# Patient Record
Sex: FEMALE | Race: WHITE | NOT HISPANIC OR LATINO | Employment: OTHER | URBAN - METROPOLITAN AREA
[De-identification: names, ages, dates, MRNs, and addresses within clinical notes are randomized per-mention and may not be internally consistent; named-entity substitution may affect disease eponyms.]

---

## 2017-01-31 ENCOUNTER — OFFICE VISIT (OUTPATIENT)
Dept: PRIMARY CARE CLINIC | Facility: CLINIC | Age: 82
End: 2017-01-31
Payer: MEDICARE

## 2017-01-31 ENCOUNTER — CLINICAL SUPPORT (OUTPATIENT)
Dept: AUDIOLOGY | Facility: CLINIC | Age: 82
End: 2017-01-31

## 2017-01-31 ENCOUNTER — HOSPITAL ENCOUNTER (OUTPATIENT)
Dept: RADIOLOGY | Facility: HOSPITAL | Age: 82
Discharge: HOME | End: 2017-01-31
Attending: NURSE PRACTITIONER
Payer: MEDICARE

## 2017-01-31 VITALS
HEIGHT: 61 IN | WEIGHT: 149.94 LBS | HEART RATE: 74 BPM | OXYGEN SATURATION: 98 % | SYSTOLIC BLOOD PRESSURE: 160 MMHG | DIASTOLIC BLOOD PRESSURE: 82 MMHG | TEMPERATURE: 98 F | BODY MASS INDEX: 28.31 KG/M2

## 2017-01-31 DIAGNOSIS — S22.32XA CLOSED FRACTURE OF RIB OF LEFT SIDE, INITIAL ENCOUNTER: Primary | ICD-10-CM

## 2017-01-31 DIAGNOSIS — R07.81 RIB PAIN ON LEFT SIDE: ICD-10-CM

## 2017-01-31 DIAGNOSIS — R60.0 PEDAL EDEMA: ICD-10-CM

## 2017-01-31 DIAGNOSIS — Z46.1 ENCOUNTER FOR HEARING AID FITTING OF BOTH EARS: Primary | ICD-10-CM

## 2017-01-31 PROCEDURE — 99999 PR PBB SHADOW E&M-EST. PATIENT-LVL IV: CPT | Mod: PBBFAC,,, | Performed by: NURSE PRACTITIONER

## 2017-01-31 PROCEDURE — 99214 OFFICE O/P EST MOD 30 MIN: CPT | Mod: S$PBB,,, | Performed by: NURSE PRACTITIONER

## 2017-01-31 PROCEDURE — 71100 X-RAY EXAM RIBS UNI 2 VIEWS: CPT | Mod: TC,PO

## 2017-01-31 PROCEDURE — STAMTAX SALES TAX 9.75%: Mod: CSM,,, | Performed by: AUDIOLOGIST

## 2017-01-31 PROCEDURE — 71100 X-RAY EXAM RIBS UNI 2 VIEWS: CPT | Mod: 26,,, | Performed by: RADIOLOGY

## 2017-01-31 PROCEDURE — V5140 BEHIND EAR BINAUR HEARING AI: HCPCS | Mod: CSM,,, | Performed by: AUDIOLOGIST

## 2017-01-31 RX ORDER — NIFEDIPINE 30 MG/1
30 TABLET, EXTENDED RELEASE ORAL DAILY
COMMUNITY
End: 2017-03-20 | Stop reason: SDUPTHER

## 2017-01-31 RX ORDER — FUROSEMIDE 20 MG/1
TABLET ORAL
Qty: 1 TABLET | Refills: 0 | Status: SHIPPED | OUTPATIENT
Start: 2017-01-31 | End: 2017-02-16

## 2017-01-31 NOTE — PROGRESS NOTES
Cari Simental is a 89 y.o. female patient of Dr. Cristi King MD who presents to the clinic today for   Chief Complaint   Patient presents with    Fall     last week     Hip Pain     left hip had a fall last week     Foot Swelling   .    HPI      Pt, who is not known to me, reports a new problem to me, as follows:  While she was going to lunch, she tripped and fell onto her face/nose and under the left rib.  Hurting her to breathe.  Pain has improved some.  No trouble breathing, coughing, feeling like she's sick.    She's also got a lot of swelling in her feet.  She has been sitting more and hasn't been putting her feet up.  No change in the diet.    These symptoms began 1 week ago and status is improved.     Pt denies the following symptoms:  Fever, coughing, feeling ill    Aggravating factors include certain movements hurt the area.      Relieving factors include ice, tylenol .    OTC Medications tried are tylenol--helps some.    Prescription medications taken for symptoms are none.    Pertinent medical history:  Fell last week and continues to have pain.    ROS    Constitutional:  No fever, no change fatigue.    HEENT: Pain and swelling of the nose from the fall.    Heart/Lung:  Bilat pedal edema.  No cough or trouble breathing.    MS:  See Chief Complaint/HPI      ALLERGIES AND MEDICATIONS: updated and reviewed.  Review of patient's allergies indicates:  No Known Allergies  Current Outpatient Prescriptions   Medication Sig Dispense Refill    lisinopril (PRINIVIL,ZESTRIL) 20 MG tablet Take 20 mg by mouth once daily.      lisinopril-hydrochlorothiazide (PRINZIDE,ZESTORETIC) 20-25 mg Tab       mirtazapine (REMERON) 30 MG tablet Take one tablet PO nightly 90 tablet 3    nifedipine 30 MG ORAL TR24 (PROCARDIA-XL) 30 MG (OSM) 24 hr tablet Take 30 mg by mouth once daily.      sertraline (ZOLOFT) 100 MG tablet Take one tablet po twice daily 180 tablet 3    simvastatin (ZOCOR) 20 MG tablet Take 20 mg by  "mouth every evening.      vitamin D 1000 units Tab Take 185 mg by mouth 3 (three) times daily.      furosemide (LASIX) 20 MG tablet 1/2 pill daily in the morning for 2 days.  Please cut the pill in half for the pt, as she lives at assisted living. 1 tablet 0     No current facility-administered medications for this visit.          PHYSICAL EXAM    Alert, coop 89 y.o. female patient in mod distress, r/t pain.    Vitals:    01/31/17 1458   BP: (!) 160/82   BP Location: Right arm   Patient Position: Sitting   BP Method: Manual   Pulse: 74   Temp: 97.5 °F (36.4 °C)   TempSrc: Oral   SpO2: 98%   Weight: 68 kg (149 lb 14.6 oz)   Height: 5' 1" (1.549 m)     VS reviewed.  VS SBP mildly elevated.  CC, nursing note, medications, PMH, PSH and Soc hx all reviewed today.    Head:  Normocephalic, atraumatic.    EENT: Ext ears normal.  Bridge of nose with healing ecchymosis and edema,  to touch.  Edema causing some decrease air flow through the nares.  Eyes with normal lids, not injected.           Resp:  Respirations even, unlabored.              Lungs CTA bilat.    Heart:  RRR, no MRG  CV:  Cap RF brisk, post tib pulses 2+ bilat.          Bilat pedal edema up past the ankles, nonpitting    MS:  Left ant ribs under the lat inf breast is tender to palp.          No pain with palp of the ribs away from the area of pain.          No inc pain with AP or lat rib cage compression away from the area of pain.          Bridge of the nose edematous, ecchymotic from the fall.          When she moves (rises from chair, walks, gets into the WC) she makes small grunting noises but doesn't appear in resp distress.            NEURO:  Alert and oriented x 4.  Responds appropriately during interaction.    Skin:  Warm, dry, color good.    Psych:  Responds appropriately throughout the visit.               Relaxed.  Well-groomed.      Closed fracture of rib of left side, initial encounter  Comments:  ribs 3-6 on the left, mildly " displaced.    Rib pain on left side  -     X-Ray Ribs 2 View Left; Future; Expected date: 1/31/17    Pedal edema  -     furosemide (LASIX) 20 MG tablet; 1/2 pill daily in the morning for 2 days.  Please cut the pill in half for the pt, as she lives at assisted living.  Dispense: 1 tablet; Refill: 0      Pt today presents with continuing left ant rib pain and nose pain s/p fall 1 week ago.  She denies coughing, feeling ill..    This is a new problem to me and the following work up is planned.    Lab & Radiological Tests Ordered: Rib xray, left.  The results are ribs 3-6 fractured with mild displacement, ? Trace pleural fluid.  The xray was read by radiologist.    I have reviewed the following additional tests today: CMP for electrolytes, last done 2014, normal      Pt advised to perform comfort measures recommended on patient instruction sheet .    If worse fever, cough or feeling ill, the patient/daughter is advised to call us as this could mean she's getting pneumonia.  Explained exam findings, diagnosis and treatment plan to patient and her daughter, with her during the visit.  Questions answered and patient states understanding.

## 2017-01-31 NOTE — PATIENT INSTRUCTIONS
For the rib:  Xray--we'll call with the results (Linda Mobley ).    Cough and deep breathe many times a day.  Hold a pillow over the sore area.  Use analgesic patch from the store for pain.    For the ankles:  1/2 tablet of 20 mg lasix (furosemide) daily for 2 days (in the morning)  Elevate the feet.  Call the cardiologist, if any concerns about this.    If you have any questions, please call.  You can reach us at [phone] Monday through Friday (except holidays) 12 nooon to 6 p.m.    I will be in the office through 2/2/17.  I will return 2/13/17.  After that time, call your doctor's nurse, [facility] 24 hr nurse advice line or the on-call doctor for the day.    After hours and on weekends you can talk to a nurse 24 hrs a day/7 days a week.  Call [facility] at .    Dr. Guan and Dr. Avalos are accepting new patients.    Thank you for using the [facility]!    Анна Whiting, AMAN, CNP, FNP-BC  [facility]  [place]

## 2017-01-31 NOTE — Clinical Note
Cristi King MD,  I saw your patient today in the [facility].  If you have any questions, please do not hesitate to contact me.  Thank you!  RONY Ramírez

## 2017-01-31 NOTE — PROGRESS NOTES
Delivered a pair of Phonak Audeo B50-R RUDOLPH BTE (silver gray) hearing aids with size 2 power receivers, large power domes and retention wires for both ears. Set to 90% target gain in the right aid and 80% target gain in the left aid, no occlusion compensation in either aid, deactivated push button, and ran FB manager AU. Pt reported that these settings were comfortable and was happy with the perception of binaural hearing rather than monaural hearing.  Reviewed insertion/removal, daily care and maintenance, wax filter changing and battery charging procedure with patient and her niece (Linda). Pt's daughter will coordinate daily care and insertion/charging assistance with the staff at [facility] as needed. Scheduled pt to return in one week for a f/u appt to make adjustments to her settings as needed.     HA Model and Style: Phonak Audeo B5-R RUDOLPH BTEs (silver gray)  Right S/N: [id number]  Left S/N: [id number]   Size:   2 power right and left            Tips Used: large power domes    Repair Warranty and Loss/Damage Policy Expires:  4/4/2020

## 2017-02-16 ENCOUNTER — HOSPITAL ENCOUNTER (OUTPATIENT)
Dept: RADIOLOGY | Facility: HOSPITAL | Age: 82
Discharge: HOME | End: 2017-02-16
Attending: INTERNAL MEDICINE
Payer: MEDICARE

## 2017-02-16 ENCOUNTER — OFFICE VISIT (OUTPATIENT)
Dept: INTERNAL MEDICINE | Facility: CLINIC | Age: 82
End: 2017-02-16
Payer: MEDICARE

## 2017-02-16 VITALS
WEIGHT: 156.31 LBS | BODY MASS INDEX: 29.51 KG/M2 | HEART RATE: 72 BPM | HEIGHT: 61 IN | OXYGEN SATURATION: 95 % | RESPIRATION RATE: 18 BRPM

## 2017-02-16 DIAGNOSIS — L84 CALLUS OF FOOT: ICD-10-CM

## 2017-02-16 DIAGNOSIS — I10 ESSENTIAL HYPERTENSION: Primary | ICD-10-CM

## 2017-02-16 DIAGNOSIS — E78.5 HYPERLIPIDEMIA, UNSPECIFIED HYPERLIPIDEMIA TYPE: ICD-10-CM

## 2017-02-16 DIAGNOSIS — E05.90 HYPERTHYROIDISM: ICD-10-CM

## 2017-02-16 DIAGNOSIS — I51.89 DIASTOLIC DYSFUNCTION: ICD-10-CM

## 2017-02-16 DIAGNOSIS — R60.9 EDEMA, UNSPECIFIED TYPE: ICD-10-CM

## 2017-02-16 PROCEDURE — 93970 EXTREMITY STUDY: CPT | Mod: 26,,, | Performed by: RADIOLOGY

## 2017-02-16 PROCEDURE — 99999 PR PBB SHADOW E&M-EST. PATIENT-LVL III: CPT | Mod: PBBFAC,,, | Performed by: INTERNAL MEDICINE

## 2017-02-16 PROCEDURE — 99204 OFFICE O/P NEW MOD 45 MIN: CPT | Mod: S$PBB,,, | Performed by: INTERNAL MEDICINE

## 2017-02-16 PROCEDURE — 93970 EXTREMITY STUDY: CPT | Mod: TC,PO

## 2017-02-16 RX ORDER — MUPIROCIN 20 MG/G
OINTMENT TOPICAL 3 TIMES DAILY
Qty: 22 G | Refills: 0 | Status: SHIPPED | OUTPATIENT
Start: 2017-02-16 | End: 2017-02-26

## 2017-02-16 RX ORDER — LOSARTAN POTASSIUM AND HYDROCHLOROTHIAZIDE 12.5; 1 MG/1; MG/1
1 TABLET ORAL DAILY
Qty: 30 TABLET | Refills: 1 | Status: SHIPPED | OUTPATIENT
Start: 2017-02-16 | End: 2017-03-06

## 2017-02-16 RX ORDER — MUPIROCIN 20 MG/G
OINTMENT TOPICAL 3 TIMES DAILY
Qty: 22 G | Refills: 0 | Status: SHIPPED | OUTPATIENT
Start: 2017-02-16 | End: 2017-02-16 | Stop reason: SDUPTHER

## 2017-02-16 NOTE — MR AVS SNAPSHOT
[facility] Medicine  [address] [facility] [address] [place] [address]  Phone: [phone]  Fax: [phone]                  Cari Simental   2017 2:00 PM   Office Visit    Description:  Female : 1926   Provider:  Ros Herrera MD   Department:  [facility] Internal Medicine           Reason for Visit     Establish Care           Diagnoses this Visit        Comments    Essential hypertension    -  Primary     Hyperthyroidism         Hyperlipidemia, unspecified hyperlipidemia type         Callus of foot         Edema, unspecified type         Diastolic dysfunction                To Do List           Future Appointments        Provider Department Dept Phone    2017 3:15 PM LAB, [place] [facility] [phone]    2017 3:30 PM [facility] [phone]    2017 11:20 AM Dereck Crawford DPM [facility] Podiatry [phone]    3/6/2017 10:00 AM [facility], NURSE [facility] Family Medicine [phone]    4/3/2017 11:20 AM Gary Dee MD [facility] Cardiology [phone]      Goals (5 Years of Data)     None      Follow-Up and Disposition     Follow-up and Disposition History       These Medications        Disp Refills Start End    losartan-hydrochlorothiazide 100-12.5 mg (HYZAAR) 100-12.5 mg Tab 30 tablet 1 2017    Take 1 tablet by mouth once daily. - Oral    Pharmacy: [facility] [address] [place] [id number] [facility] Ph #: [phone]       mupirocin (BACTROBAN) 2 % ointment 22 g 0 2017    Apply topically 3 (three) times daily. - Topical (Top)    Pharmacy: [facility] [address] [facility] Ph #: [phone]         [facility]     [facility] Nurse Care Line -  Assistance  Registered nurses in the [facility] Center provide clinical advisement, health education, appointment booking, and other advisory services.  Call for this free  "service at [phone].             Medications           START taking these NEW medications        Refills    losartan-hydrochlorothiazide 100-12.5 mg (HYZAAR) 100-12.5 mg Tab 1    Sig: Take 1 tablet by mouth once daily.    Class: Normal    Route: Oral    mupirocin (BACTROBAN) 2 % ointment 0    Sig: Apply topically 3 (three) times daily.    Class: Normal    Route: Topical (Top)      STOP taking these medications     furosemide (LASIX) 20 MG tablet 1/2 pill daily in the morning for 2 days.  Please cut the pill in half for the pt, as she lives at assisted living.    lisinopril (PRINIVIL,ZESTRIL) 20 MG tablet Take 20 mg by mouth once daily.    lisinopril-hydrochlorothiazide (PRINZIDE,ZESTORETIC) 20-25 mg Tab            Verify that the below list of medications is an accurate representation of the medications you are currently taking.  If none reported, the list may be blank. If incorrect, please contact your healthcare provider. Carry this list with you in case of emergency.           Current Medications     mirtazapine (REMERON) 30 MG tablet Take one tablet PO nightly    nifedipine 30 MG ORAL TR24 (PROCARDIA-XL) 30 MG (OSM) 24 hr tablet Take 30 mg by mouth once daily.    sertraline (ZOLOFT) 100 MG tablet Take one tablet po twice daily    simvastatin (ZOCOR) 20 MG tablet Take 20 mg by mouth every evening.    vitamin D 1000 units Tab Take 185 mg by mouth 3 (three) times daily.    losartan-hydrochlorothiazide 100-12.5 mg (HYZAAR) 100-12.5 mg Tab Take 1 tablet by mouth once daily.    mupirocin (BACTROBAN) 2 % ointment Apply topically 3 (three) times daily.           Clinical Reference Information           Your Vitals Were     Pulse Resp Height Weight SpO2 BMI    72 18 5' 1" (1.549 m) 70.9 kg (156 lb 4.9 oz) 95% 29.53 kg/m2      Allergies as of 2/16/2017     No Known Allergies      Immunizations Administered on Date of Encounter - 2/16/2017     None      Orders Placed During Today's Visit      Normal Orders This Visit "    Ambulatory referral to Cardiology     Ambulatory referral to Podiatry     Future Labs/Procedures Expected by Expires    CBC auto differential  2/16/2017 5/17/2017    Comprehensive metabolic panel  2/16/2017 5/17/2017    Lipid panel  2/16/2017 2/17/2018    TSH  2/16/2017 2/17/2018    US Lower Extremity Veins Bilateral  2/16/2017 2/16/2018      Language Assistance Services     ATTENTION: Language assistance services are available, free of charge. Please call [phone].      ATENCIÓN: Si habla leablaise, tiene a hayes disposición servicios gratuitos de asistencia lingüística. Llame al [phone].     CHÚ Ý: N?u b?n nói Ti?ng Vi?t, có các d?ch v? h? tr? ngôn ng? mi?n phí dành cho b?n. G?i s? [phone].         [facility] complies with applicable Federal civil rights laws and does not discriminate on the basis of race, color, national origin, age, disability, or sex.

## 2017-02-16 NOTE — PROGRESS NOTES
"HISTORY OF PRESENT ILLNESS:  Pt. is a 89 y.o. female presents to establish care.  She really never had a primary care MD, Dr. King acted as that for her, but she was seen by Dr. Aguilar recently last month.  Though she is now in [place] and needs a local MD.  She has [facility] health through Dr. Aguilar.  It seems she sees cardiology Dr. Cristi King.  She has history of HTN, depression, dementia, hyperlipidemia, hyperthyroidism/Dr. Shell.  She states she last saw Dr. Shlel within the year, pt. States was stable, followup in one year.  She states she was diagnosed 8 years ago, used to take medication.  She states had ablation.  States did not have Graves Disease.  I have also reviewed Dr. Short's note, especially her instructions not to rely on benzodiazepines due to their high risk with this pt.  I agree with this approach.  She has had multiple elevated B/Ps, though family states systolics in the 140s.  2D echo 2014 showed:  "CONCLUSIONS     1 - Normal left ventricular systolic function (EF 60-65%).     2 - Left ventricular diastolic dysfunction.     3 - Concentric remodeling.     4 - Mild mitral regurgitation."    She states she had a "new" pneumonia shot at [facility].  She is here with her niece, who cares for her locally.      ROS:  GENERAL: No fever, chills, fatigability or weight loss.  SKIN: No rashes, itching or changes in color or texture of skin; large callus to left heel;  HEAD: No headaches or recent head trauma.  EARS: Denies ear pain, discharge or vertigo; wears hearing aids;  NOSE: No loss of smell, no epistaxis; occ postnasal drip.  MOUTH & THROAT: No hoarseness or change in voice. No excessive gum bleeding.  NODES: Denies swollen glands.  CHEST: Denies US, cyanosis, wheezing, cough and sputum production.  CARDIOVASCULAR: Denies chest pain, PND, orthopnea or reduced exercise tolerance.  ABDOMEN: Appetite fine. No weight loss. Denies constipation, diarrhea, abdominal pain, hematemesis " or blood in stool.  URINARY: No flank pain, dysuria or hematuria.  PERIPHERAL VASCULAR: No claudication or cyanosis. No edema.  MUSCULOSKELETAL: No joint stiffness or swelling. Denies back pain.  NEUROLOGIC: Denies numbness; weakness, ambulates with walker;    PE:   Vitals: 154/84    Vitals:    02/16/17 1358   Pulse: 72   Resp: 18       GENERAL: no acute distress, A&Ox3, comfortable.  Female with BMI of 28   HEENT: tympanic membranes clear, nasal mucosa pink, no pharyngeal erythema or exudate  NECK: supple, no cervical lymphadenopathy, no thyromegaly; no supraclavicular nodes;   CHEST:  clear to auscultation bilaterally, no crackles or wheeze; no increased work of breathing;  CARDIOVASCULAR: regular rate and rhythm, no rubs, murmurs or gallops.  ABDOMEN: normal bowel sounds, soft non-tender, non-distended; no palpable organomegaly;   EXT: no clubbing, cyanosis; positive edema to both lower extremities; positive callus/wound to heel on left;    ASSESSMENT/PLAN:    Extremely difficult for ptl to come in before breakfast, will go ahead and get lipid, as mostly interested in LDL;    Essential hypertension  -     losartan-hydrochlorothiazide 100-12.5 mg (HYZAAR) 100-12.5 mg Tab; Take 1 tablet by mouth once daily.  Dispense: 30 tablet; Refill: 1  -     CBC auto differential; Future; Expected date: 2/16/17    Hyperthyroidism  -     TSH; Future; Expected date: 2/16/17    Hyperlipidemia, unspecified hyperlipidemia type  -     Comprehensive metabolic panel; Future; Expected date: 2/16/17  -     Lipid panel; Future; Expected date: 2/16/17    Callus of foot  -     Ambulatory referral to Podiatry  -     Discontinue: mupirocin (BACTROBAN) 2 % ointment; Apply topically 3 (three) times daily.  Dispense: 22 g; Refill: 0  -     mupirocin (BACTROBAN) 2 % ointment; Apply topically 3 (three) times daily.  Dispense: 22 g; Refill: 0    Edema, unspecified type  -     US Lower Extremity Veins Bilateral; Future; Expected date:  2/16/17    Diastolic dysfunction  -     Ambulatory referral to Cardiology      Medication List with Changes/Refills   New Medications    LOSARTAN-HYDROCHLOROTHIAZIDE 100-12.5 MG (HYZAAR) 100-12.5 MG TAB    Take 1 tablet by mouth once daily.    MUPIROCIN (BACTROBAN) 2 % OINTMENT    Apply topically 3 (three) times daily.   Current Medications    MIRTAZAPINE (REMERON) 30 MG TABLET    Take one tablet PO nightly    NIFEDIPINE 30 MG ORAL TR24 (PROCARDIA-XL) 30 MG (OSM) 24 HR TABLET    Take 30 mg by mouth once daily.    SERTRALINE (ZOLOFT) 100 MG TABLET    Take one tablet po twice daily    SIMVASTATIN (ZOCOR) 20 MG TABLET    Take 20 mg by mouth every evening.    VITAMIN D 1000 UNITS TAB    Take 185 mg by mouth 3 (three) times daily.   Discontinued Medications    FUROSEMIDE (LASIX) 20 MG TABLET    1/2 pill daily in the morning for 2 days.  Please cut the pill in half for the pt, as she lives at assisted living.    LISINOPRIL (PRINIVIL,ZESTRIL) 20 MG TABLET    Take 20 mg by mouth once daily.    LISINOPRIL-HYDROCHLOROTHIAZIDE (PRINZIDE,ZESTORETIC) 20-25 MG TAB           Call if condition changes or worsens.

## 2017-02-20 ENCOUNTER — OFFICE VISIT (OUTPATIENT)
Dept: PODIATRY | Facility: CLINIC | Age: 82
End: 2017-02-20
Payer: MEDICARE

## 2017-02-20 VITALS — WEIGHT: 156.94 LBS | HEIGHT: 61 IN | BODY MASS INDEX: 29.63 KG/M2

## 2017-02-20 DIAGNOSIS — M79.672 FOOT PAIN, LEFT: ICD-10-CM

## 2017-02-20 DIAGNOSIS — L89.622 PRESSURE ULCER OF LEFT HEEL, STAGE 2: Primary | ICD-10-CM

## 2017-02-20 PROCEDURE — 99204 OFFICE O/P NEW MOD 45 MIN: CPT | Mod: S$PBB,25,, | Performed by: PODIATRIST

## 2017-02-20 PROCEDURE — 87070 CULTURE OTHR SPECIMN AEROBIC: CPT

## 2017-02-20 PROCEDURE — 97597 DBRDMT OPN WND 1ST 20 CM/<: CPT | Mod: PBBFAC,PO | Performed by: PODIATRIST

## 2017-02-20 PROCEDURE — 99213 OFFICE O/P EST LOW 20 MIN: CPT | Mod: PBBFAC,PO | Performed by: PODIATRIST

## 2017-02-20 PROCEDURE — 87075 CULTR BACTERIA EXCEPT BLOOD: CPT

## 2017-02-20 PROCEDURE — 99999 PR PBB SHADOW E&M-EST. PATIENT-LVL III: CPT | Mod: PBBFAC,,, | Performed by: PODIATRIST

## 2017-02-20 NOTE — MR AVS SNAPSHOT
"    [place] - [facility]  [address]  [place] [address]  Phone: [phone]                  Cari Simental   2017 11:20 AM   Office Visit    Description:  Female : 1926   Provider:  Dereck Crawford DPM   Department:  [facility] Podiatry           Reason for Visit     Wound Care                To Do List           Future Appointments        Provider Department Dept Phone    3/6/2017 9:20 AM Dereck Crawford DPM [facility] Podiatry [phone]    3/6/2017 10:00 AM [facility], NURSE [facility] Family Medicine [phone]    4/3/2017 11:20 AM Gary Dee MD [facility] Cardiology [phone]      Goals (5 Years of Data)     None      [facility]     [facility] Nurse Care Line -  Assistance  Registered nurses in the [facility] provide clinical advisement, health education, appointment booking, and other advisory services.  Call for this free service at [phone].             Medications                Verify that the below list of medications is an accurate representation of the medications you are currently taking.  If none reported, the list may be blank. If incorrect, please contact your healthcare provider. Carry this list with you in case of emergency.           Current Medications     losartan-hydrochlorothiazide 100-12.5 mg (HYZAAR) 100-12.5 mg Tab Take 1 tablet by mouth once daily.    mirtazapine (REMERON) 30 MG tablet Take one tablet PO nightly    mupirocin (BACTROBAN) 2 % ointment Apply topically 3 (three) times daily.    nifedipine 30 MG ORAL TR24 (PROCARDIA-XL) 30 MG (OSM) 24 hr tablet Take 30 mg by mouth once daily.    sertraline (ZOLOFT) 100 MG tablet Take one tablet po twice daily    simvastatin (ZOCOR) 20 MG tablet Take 20 mg by mouth every evening.    vitamin D 1000 units Tab Take 185 mg by mouth 3 (three) times daily.           Clinical Reference Information           Your Vitals Were     Height Weight BMI          5' 1" (1.549 m) 71.2 kg " (156 lb 15.5 oz) 29.66 kg/m2        Allergies as of 2/20/2017     No Known Allergies      Immunizations Administered on Date of Encounter - 2/20/2017     None      Language Assistance Services     ATTENTION: Language assistance services are available, free of charge. Please call [phone].      ATENCIÓN: Si habla rhoda, tiene a hayes disposición servicios gratuitos de asistencia lingüística. Llame al [phone].     CHÚ Ý: N?u b?n nói Ti?ng Vi?t, có các d?ch v? h? tr? ngôn ng? mi?n phí dành cho b?n. G?i s? [phone].         [facility] complies with applicable Federal civil rights laws and does not discriminate on the basis of race, color, national origin, age, disability, or sex.

## 2017-02-20 NOTE — PROGRESS NOTES
Subjective:      Patient ID: Cari Simental is a 89 y.o. female.    Chief Complaint: Wound Care (left heel)  Patient presents to clinic with the complaint of a Lt. Heel wound with an onset x 2-3 days.  States the heel wound emits sharp pain that she currently rates as a 4/10.  States symptoms are exacerbated with pressure to the heel and are alleviated with rest.  Was seen by her PCP and instructed to treat with a prescription of Bactroban which she has applied daily to the wound.  Unable to recall the inciting event leading to the ulceration.  Has not attempted to self treat.  Denies any additional pedal complaints.    Past Medical History   Diagnosis Date    Arthritis     Callus of foot 2/16/2017    Dizziness     H/O radioactive iodine thyroid ablation     Hearing loss     Hyperlipidemia     Hypertension     Hyperthyroidism     Mental disorder     Obesity     Sinusitis        Past Surgical History   Procedure Laterality Date    Adenoidectomy      Tonsillectomy      Thyroidectomy, partial         Family History   Problem Relation Age of Onset    COPD Mother      emphysema    Stroke Mother     Cancer Father      brain tumor;    Cancer Sister      breast cancer/thyroid cancer;    Allergic rhinitis Neg Hx     Allergies Neg Hx     Angioedema Neg Hx     Eczema Neg Hx     Immunodeficiency Neg Hx        Social History     Social History    Marital status:      Spouse name: N/A    Number of children: 0    Years of education: N/A     Occupational History    retired clerical      Social History Main Topics    Smoking status: Never Smoker    Smokeless tobacco: Never Used    Alcohol use No    Drug use: No    Sexual activity: No     Other Topics Concern    None     Social History Narrative       Current Outpatient Prescriptions   Medication Sig Dispense Refill    losartan-hydrochlorothiazide 100-12.5 mg (HYZAAR) 100-12.5 mg Tab Take 1 tablet by mouth once daily. 30 tablet 1     mirtazapine (REMERON) 30 MG tablet Take one tablet PO nightly 90 tablet 3    mupirocin (BACTROBAN) 2 % ointment Apply topically 3 (three) times daily. 22 g 0    nifedipine 30 MG ORAL TR24 (PROCARDIA-XL) 30 MG (OSM) 24 hr tablet Take 30 mg by mouth once daily.      sertraline (ZOLOFT) 100 MG tablet Take one tablet po twice daily 180 tablet 3    simvastatin (ZOCOR) 20 MG tablet Take 20 mg by mouth every evening.      vitamin D 1000 units Tab Take 185 mg by mouth 3 (three) times daily.       No current facility-administered medications for this visit.        Review of patient's allergies indicates:  No Known Allergies  Review of Systems   Constitution: Negative for chills and fever.   Cardiovascular: Positive for leg swelling. Negative for claudication.   Skin: Negative for color change and nail changes.   Musculoskeletal: Positive for arthritis and joint swelling.   Neurological: Negative for numbness and paresthesias.   Psychiatric/Behavioral: Negative for altered mental status.           Objective:      Physical Exam   Constitutional: She is oriented to person, place, and time. She appears well-developed and well-nourished. No distress.   Cardiovascular:   Pulses:       Dorsalis pedis pulses are 2+ on the right side, and 2+ on the left side.        Posterior tibial pulses are 2+ on the right side, and 2+ on the left side.   CFT <3 seconds bilateral.  Pedal hair growth decreased bilateral.  Varicosities noted bilateral.  1+ pitting edema noted to bilateral lower extremity.  Toes are cool to touch bilateral.    Musculoskeletal: She exhibits edema and tenderness.   Muscle strength 5/5 in all muscle groups bilateral.  No tenderness nor crepitation with ROM of foot/ankle joints bilateral.  Pain with palpation of the Lt. Posterior heel wound.  Bilateral pes planus foot type.   Neurological: She is alert and oriented to person, place, and time. She has normal strength. No sensory deficit.   Light touch intact  bilateral.    Skin: Skin is warm, dry and intact. Lesion noted. No abrasion, no bruising, no ecchymosis, no laceration, no petechiae and no rash noted. She is not diaphoretic. No cyanosis or erythema. No pallor. Nails show no clubbing.   Increased pedal turgor noted bilateral.  Toenails x 10 appear normotrophic. Examination of the skin reveals no evidence of significant maceration, rashes, suspicious appearing nevi or other concerning lesions.    Open wound noted to the Lt. Posterior heel.  Wound base is comprised entirely by fibrin.  Priya wound is devoid of erythema, edema, fluctuance, purulence, and malodor.  Measures 0.6 x 0.7 x 0.1cm.             Assessment:       Encounter Diagnoses   Name Primary?    Pressure ulcer of left heel, stage 2 Yes    Foot pain, left          Plan:       Cari was seen today for wound care.    Diagnoses and all orders for this visit:    Pressure ulcer of left heel, stage 2  -     Aerobic culture (Specify Source)  -     CULTURE, ANAEROBE    Foot pain, left      I counseled the patient on her conditions, their implications and medical management.    Discussed with patient the need for debridement of the Lt. heel wound.  Reviewed associated risks, benefits, procedure, and follow up care to which all questions were thoroughly answered. Consent was read, signed, and witnessed.  See attached procedure note.     Alejandra was applied to the wound and offloaded with cathleen foam.  A compressive dressing was then applied.     Advised to rest and elevate the affected extremity to facilitate wound healing.     Fitted and dispensed a postoperative shoe to offload the ulceration site.     Orders written for cultures of the wound.     RTC in 1 week for follow up.     Dereck Crawford DPM

## 2017-02-20 NOTE — LETTER
February 20, 2017      Ros Herrera MD  [address] [facility] [address] [place] [address]           [facility]  [address] [facility] [address] [place] [address]  Phone: [phone]          Patient: Cari Simental   MR Number: [id number]   YOB: 1926   Date of Visit: 2/20/2017       Dear Dr. Ros Herrera:    Thank you for referring Cari Simental to me for evaluation. Attached you will find relevant portions of my assessment and plan of care.    If you have questions, please do not hesitate to call me. I look forward to following Cari Simental along with you.    Sincerely,    Dereck Crawford, SERA    Enclosure  CC:  No Recipients    If you would like to receive this communication electronically, please contact [email or web address] or [phone] to request more information on [facility] access.    For providers and/or their staff who would like to refer a patient to [facility], please contact us through our one-stop-shop provider referral line, [facility], at [phone].    If you feel you have received this communication in error or would no longer like to receive these types of communications, please e-mail [email or web address]

## 2017-02-20 NOTE — PROCEDURES
"Wound Debridement  Date/Time: 2/20/2017 4:38 PM  Performed by: CHAN BARRON  Authorized by: CHAN BARRON     Time out: Immediately prior to procedure a "time out" was called to verify the correct patient, procedure, equipment, support staff and site/side marked as required.    Consent Done?:  Yes (Written)    Preparation: Patient was prepped and draped in usual sterile fashion    Local anesthesia used?: No      Wound Details:    Location:  Left foot    Location:  Left Heel    Type of Debridement:  Excisional       Length (cm):  0.6       Area (sq cm):  0.42       Width (cm):  0.7       Percent Debrided (%):  100       Depth (cm):  0.1       Total Area Debrided (sq cm):  0.42    Depth of debridement:  Epidermis/Dermis    Tissue debrided:  Dermis    Devitalized tissue debrided:  Fibrin    Instruments:  Blade    Bleeding:  Minimal  Hemostasis Achieved: Yes    Method Used:  Pressure  Patient tolerance:  Patient tolerated the procedure well with no immediate complications      "

## 2017-02-21 ENCOUNTER — TELEPHONE (OUTPATIENT)
Dept: FAMILY MEDICINE | Facility: CLINIC | Age: 82
End: 2017-02-21

## 2017-02-21 NOTE — TELEPHONE ENCOUNTER
----- Message from Zaida Paredes sent at 2/21/2017  1:55 PM CST -----  Contact: Karissa with [facility] needs order for Losartan and DC for both Lisinopril for patient, faxed to [phone]. Any questions call Karissa at [phone].

## 2017-02-22 LAB — BACTERIA SPEC AEROBE CULT: NORMAL

## 2017-02-24 LAB — BACTERIA SPEC ANAEROBE CULT: NORMAL

## 2017-03-06 ENCOUNTER — CLINICAL SUPPORT (OUTPATIENT)
Dept: FAMILY MEDICINE | Facility: CLINIC | Age: 82
End: 2017-03-06
Payer: MEDICARE

## 2017-03-06 ENCOUNTER — OFFICE VISIT (OUTPATIENT)
Dept: PODIATRY | Facility: CLINIC | Age: 82
End: 2017-03-06
Payer: MEDICARE

## 2017-03-06 VITALS — SYSTOLIC BLOOD PRESSURE: 155 MMHG | DIASTOLIC BLOOD PRESSURE: 80 MMHG

## 2017-03-06 VITALS — WEIGHT: 156.5 LBS | BODY MASS INDEX: 29.55 KG/M2 | HEIGHT: 61 IN

## 2017-03-06 DIAGNOSIS — Z87.2 HEALED ULCER OF LEFT FOOT ON EXAMINATION: Primary | ICD-10-CM

## 2017-03-06 PROCEDURE — 99999 PR PBB SHADOW E&M-EST. PATIENT-LVL III: CPT | Mod: PBBFAC,,, | Performed by: PODIATRIST

## 2017-03-06 PROCEDURE — 99213 OFFICE O/P EST LOW 20 MIN: CPT | Mod: S$PBB,,, | Performed by: PODIATRIST

## 2017-03-06 RX ORDER — VALSARTAN AND HYDROCHLOROTHIAZIDE 160; 12.5 MG/1; MG/1
1 TABLET, FILM COATED ORAL DAILY
Qty: 30 TABLET | Refills: 1 | Status: SHIPPED | OUTPATIENT
Start: 2017-03-06 | End: 2017-04-26 | Stop reason: SDUPTHER

## 2017-03-06 NOTE — PROGRESS NOTES
Subjective:      Patient ID: Cari Simental is a 89 y.o. female.    Chief Complaint: Wound Care (left heel)  Patient presents to clinic for a two week follow up for a pressure ulcer of the Lt. Heel.  Denies experiencing pain to the heel with today's exam.  Has kept the previous clinic dressing clean, dry, and intact x 2 weeks.  Relates minimal ambulation to the affected extremity.  Denies having N/V/F/C/D.  Denies any additional pedal complaints.    Review of Systems   Constitution: Negative for chills and fever.   Cardiovascular: Positive for leg swelling. Negative for claudication.   Skin: Negative for color change and nail changes.   Musculoskeletal: Positive for arthritis and joint swelling.   Neurological: Negative for numbness and paresthesias.   Psychiatric/Behavioral: Negative for altered mental status.           Objective:      Physical Exam   Constitutional: She is oriented to person, place, and time. She appears well-developed and well-nourished. No distress.   Cardiovascular:   Pulses:       Dorsalis pedis pulses are 2+ on the right side, and 2+ on the left side.        Posterior tibial pulses are 2+ on the right side, and 2+ on the left side.   CFT <3 seconds bilateral.  Pedal hair growth decreased bilateral.  Varicosities noted bilateral.  1+ pitting edema noted to bilateral lower extremity.  Toes are cool to touch bilateral.    Musculoskeletal: She exhibits edema. She exhibits no tenderness.   Muscle strength 5/5 in all muscle groups bilateral.  No tenderness nor crepitation with ROM of foot/ankle joints bilateral.  No pain with palpation of bilateral foot and ankle.   Bilateral pes planus foot type.   Neurological: She is alert and oriented to person, place, and time. She has normal strength. No sensory deficit.   Light touch intact bilateral.    Skin: Skin is warm, dry and intact. No abrasion, no bruising, no ecchymosis, no laceration, no lesion, no petechiae and no rash noted. She is not  diaphoretic. No cyanosis or erythema. No pallor. Nails show no clubbing.   Increased pedal turgor noted bilateral.  Toenails x 10 appear normotrophic. Examination of the skin reveals no evidence of significant maceration, rashes, suspicious appearing nevi or other concerning lesions.    Mature epithelium noted to the Lt. Posterior plantar heel at the site of a previous ulceration.               Assessment:       Encounter Diagnosis   Name Primary?    Healed ulcer of left foot on examination Yes         Plan:       Cari was seen today for wound care.    Diagnoses and all orders for this visit:    Healed ulcer of left foot on examination      I counseled the patient on her conditions, their implications and medical management.    Being that the wound has resolved with today's exam, no debridement warranted.      Advised to moisturize the site daily and to keep covered with a padded bandage x 1 week.    Instructed to avoid soaking and scrubbing of the heel x 2 weeks as the skin continues to mature.      May resume normal ambulation in a casual pair of shoes.    RTC prn.    Dereck Crawford DPM

## 2017-03-06 NOTE — MR AVS SNAPSHOT
"    [facility]  [address] [facility] [address]  [place] [address]  Phone: [phone]                  Cari Simental   3/6/2017 9:20 AM   Office Visit    Description:  Female : 1926   Provider:  Dereck Crawford DPM   Department:  [facility]           Reason for Visit     Wound Care                To Do List           Future Appointments        Provider Department Dept Phone    4/3/2017 11:20 AM Gary Dee MD [facility] [phone]      Goals (5 Years of Data)     None      [facility]     [facility] -  Assistance  Registered nurses in the [facility] Call Center provide clinical advisement, health education, appointment booking, and other advisory services.  Call for this free service at [phone].             Medications                Verify that the below list of medications is an accurate representation of the medications you are currently taking.  If none reported, the list may be blank. If incorrect, please contact your healthcare provider. Carry this list with you in case of emergency.           Current Medications     losartan-hydrochlorothiazide 100-12.5 mg (HYZAAR) 100-12.5 mg Tab Take 1 tablet by mouth once daily.    mirtazapine (REMERON) 30 MG tablet Take one tablet PO nightly    nifedipine 30 MG ORAL TR24 (PROCARDIA-XL) 30 MG (OSM) 24 hr tablet Take 30 mg by mouth once daily.    sertraline (ZOLOFT) 100 MG tablet Take one tablet po twice daily    simvastatin (ZOCOR) 20 MG tablet Take 20 mg by mouth every evening.    vitamin D 1000 units Tab Take 185 mg by mouth 3 (three) times daily.           Clinical Reference Information           Your Vitals Were     Height Weight BMI          5' 1" (1.549 m) 71 kg (156 lb 8.4 oz) 29.58 kg/m2        Allergies as of 3/6/2017     No Known Allergies      Immunizations Administered on Date of Encounter - 3/6/2017     None      Language Assistance Services     ATTENTION: Language assistance " services are available, free of charge. Please call [phone].      ATENCIÓN: Si habla rhoda, tiene a hayes disposición servicios gratuitos de asistencia lingüística. Llame al [phone].     CHÚ Ý: N?u b?n nói Ti?ng Vi?t, có các d?ch v? h? tr? ngôn ng? mi?n phí dành cho b?n. G?i s? [phone].         [facility] complies with applicable Federal civil rights laws and does not discriminate on the basis of race, color, national origin, age, disability, or sex.

## 2017-04-26 ENCOUNTER — TELEPHONE (OUTPATIENT)
Dept: FAMILY MEDICINE | Facility: CLINIC | Age: 82
End: 2017-04-26

## 2017-04-26 NOTE — TELEPHONE ENCOUNTER
----- Message from Estella Manning sent at 4/26/2017  1:55 PM CDT -----  Contact: Sahra with Angelica Bocanegra with [facility] a refill on Simvastatin 20 mg, Remeron 30 mg called into [facility] pharmacy at [phone].  Please call Sahra at [phone] if you have any questions. Thanks!

## 2017-04-27 RX ORDER — VALSARTAN AND HYDROCHLOROTHIAZIDE 160; 12.5 MG/1; MG/1
1 TABLET, FILM COATED ORAL DAILY
Qty: 30 TABLET | Refills: 0 | Status: SHIPPED | OUTPATIENT
Start: 2017-04-27 | End: 2017-05-04 | Stop reason: DRUGHIGH

## 2017-04-27 RX ORDER — SIMVASTATIN 20 MG/1
20 TABLET, FILM COATED ORAL NIGHTLY
Qty: 90 TABLET | Refills: 2 | Status: SHIPPED | OUTPATIENT
Start: 2017-04-27

## 2017-04-27 RX ORDER — MIRTAZAPINE 30 MG/1
TABLET, FILM COATED ORAL
Qty: 90 TABLET | Refills: 0 | Status: SHIPPED | OUTPATIENT
Start: 2017-04-27

## 2017-04-27 NOTE — TELEPHONE ENCOUNTER
This patient needs to come in for blood pressure check for I can refill her hypertension medicines for more than a month.

## 2017-04-27 NOTE — TELEPHONE ENCOUNTER
Pt niece states that pt is memory care right now and she is currently receiving home health and it would be a taxing effort for her to come in.

## 2017-05-04 ENCOUNTER — TELEPHONE (OUTPATIENT)
Dept: FAMILY MEDICINE | Facility: CLINIC | Age: 82
End: 2017-05-04

## 2017-05-04 RX ORDER — GUAIFENESIN 400 MG/1
400 TABLET ORAL 2 TIMES DAILY
COMMUNITY

## 2017-05-04 RX ORDER — NEBIVOLOL 10 MG/1
20 TABLET ORAL DAILY
COMMUNITY
End: 2017-05-23 | Stop reason: SDUPTHER

## 2017-05-04 RX ORDER — SPIRONOLACTONE 25 MG/1
25 TABLET ORAL DAILY
COMMUNITY
End: 2017-05-23 | Stop reason: SDUPTHER

## 2017-05-04 RX ORDER — VALSARTAN AND HYDROCHLOROTHIAZIDE 320; 12.5 MG/1; MG/1
1 TABLET, FILM COATED ORAL DAILY
COMMUNITY
End: 2017-05-23 | Stop reason: SDUPTHER

## 2017-05-04 RX ORDER — HYDRALAZINE HYDROCHLORIDE 25 MG/1
25 TABLET, FILM COATED ORAL 2 TIMES DAILY
COMMUNITY
End: 2017-05-23 | Stop reason: SDUPTHER

## 2017-05-04 RX ORDER — LEVOFLOXACIN 750 MG/1
750 TABLET ORAL DAILY
COMMUNITY

## 2017-05-23 RX ORDER — SPIRONOLACTONE 25 MG/1
25 TABLET ORAL DAILY
Qty: 90 TABLET | Refills: 0 | Status: SHIPPED | OUTPATIENT
Start: 2017-05-23

## 2017-05-23 RX ORDER — VALSARTAN AND HYDROCHLOROTHIAZIDE 320; 12.5 MG/1; MG/1
1 TABLET, FILM COATED ORAL DAILY
Qty: 90 TABLET | Refills: 0 | Status: SHIPPED | OUTPATIENT
Start: 2017-05-23

## 2017-05-23 RX ORDER — HYDRALAZINE HYDROCHLORIDE 25 MG/1
25 TABLET, FILM COATED ORAL 2 TIMES DAILY
Qty: 180 TABLET | Refills: 0 | Status: SHIPPED | OUTPATIENT
Start: 2017-05-23

## 2017-05-23 RX ORDER — NEBIVOLOL 10 MG/1
20 TABLET ORAL DAILY
Qty: 180 TABLET | Refills: 0 | Status: SHIPPED | OUTPATIENT
Start: 2017-05-23

## 2017-05-23 NOTE — TELEPHONE ENCOUNTER
----- Message from Irene Palmer sent at 5/23/2017 11:23 AM CDT -----  Contact: Becca Don wants to speak with a nurse regarding sending fax for refilling patient medication please call back at [phone]

## 2017-05-23 NOTE — TELEPHONE ENCOUNTER
Dr Sharon Don is saying they sent updated med list. I requested it to be sent again.   Now rx is separate  since return of hosp. diovan 320 mg q day and hctz 25mg bid . But separate not the combo

## 2017-05-24 NOTE — TELEPHONE ENCOUNTER
----- Message from Myrna Elkins sent at 5/24/2017 10:05 AM CDT -----  Contact:   call  //[phone]  [facility] //david   Calling  about a  Script  That  Was  Written  Incorrectly;y  For  Approval  Bystolic// please call